# Patient Record
Sex: MALE
[De-identification: names, ages, dates, MRNs, and addresses within clinical notes are randomized per-mention and may not be internally consistent; named-entity substitution may affect disease eponyms.]

---

## 2021-10-05 ENCOUNTER — HOSPITAL ENCOUNTER (EMERGENCY)
Dept: HOSPITAL 56 - MW.ED | Age: 61
Discharge: HOME | End: 2021-10-05
Payer: COMMERCIAL

## 2021-10-05 DIAGNOSIS — S27.0XXA: ICD-10-CM
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
## 2021-10-05 DIAGNOSIS — S22.41XA: Primary | ICD-10-CM

## 2021-10-05 DIAGNOSIS — G20: ICD-10-CM

## 2021-10-05 DIAGNOSIS — K59.00: ICD-10-CM

## 2021-10-05 DIAGNOSIS — Z91.19: ICD-10-CM

## 2021-10-05 LAB
BUN SERPL-MCNC: 16 MG/DL (ref 7–18)
CHLORIDE SERPL-SCNC: 101 MMOL/L (ref 98–107)
CO2 SERPL-SCNC: 29.2 MMOL/L (ref 21–32)
GLUCOSE SERPL-MCNC: 126 MG/DL (ref 74–106)
LIPASE SERPL-CCNC: 43 U/L (ref 73–393)
POTASSIUM SERPL-SCNC: 4.4 MMOL/L (ref 3.5–5.1)
SODIUM SERPL-SCNC: 139 MMOL/L (ref 136–148)

## 2021-10-05 PROCEDURE — 80053 COMPREHEN METABOLIC PANEL: CPT

## 2021-10-05 PROCEDURE — 85025 COMPLETE CBC W/AUTO DIFF WBC: CPT

## 2021-10-05 PROCEDURE — 81003 URINALYSIS AUTO W/O SCOPE: CPT

## 2021-10-05 PROCEDURE — 83690 ASSAY OF LIPASE: CPT

## 2021-10-05 PROCEDURE — 74177 CT ABD & PELVIS W/CONTRAST: CPT

## 2021-10-05 PROCEDURE — 36415 COLL VENOUS BLD VENIPUNCTURE: CPT

## 2021-10-05 PROCEDURE — 99284 EMERGENCY DEPT VISIT MOD MDM: CPT

## 2021-10-05 NOTE — EDM.PDOC
ED HPI GENERAL MEDICAL PROBLEM





- General


Chief Complaint: Gastrointestinal Problem


Stated Complaint: CANT HAVE A BM


Time Seen by Provider: 10/05/21 09:39


Source of Information: Reports: Patient


History Limitations: Reports: No Limitations





- History of Present Illness


INITIAL COMMENTS - FREE TEXT/NARRATIVE: 


HISTORY AND PHYSICAL:





History of present illness:


Patient is a 60-year-old male who presents to the emergency room with complaints

of right-sided abdominal and flank pain post fall. Patient states 5 days ago he 

had fallen to the ground, mechanical fall due to Parkinson's. He states he did 

hit the left side of his cheek and somehow his right hip and back. Denies any 

LOC with fall. Since this time he has had right-sided abdominal and right flank 

pain. States when he tries to have a bowel movement he is only getting a smear 

on the toilet paper, does feel constipated. Patient denies any fever, chills, 

headache, change in vision, syncope or near syncope. Denies any shortness of 

breath or cough. Denies any nausea, vomiting, diarrhea or dysuria. Has not noted

any blood in urine or stool. Denies any testicular pain, redness or swelling. 

Patient has been eating and drinking appropriately. No recent travel or sick 

contacts.





Patient's daughter has accompanied him.  She states that he has been on 2 

medications for his Parkinson's.  The carbidopa/levodopa made patient too drowsy

so he stopped taking this medication. Patient never refilled his Pramipexole.  

Patient did have an appointment yesterday with Dr. Hussein although failed to 

make this appointment.





Review of systems: 


As per history of present illness and below otherwise all systems reviewed and 

negative.





Past medical history: 


As per history of present illness and as reviewed below otherwise noncon

tributory.





Surgical history: 


As per history of present illness and as reviewed below otherwise 

noncontributory.





Social history: 


See social history for further information





Family history: 


As per history of present illness and as reviewed below otherwise 

noncontributory.





Physical exam:


General: Well developed and well nourished 60 year old male. Alert and 

orientated x 3. Nontoxic in appearance and in no acute distress. Vital signs are

stable and have been reviewed by me. Nursing notes were reviewed. 


HEENT: Nontender, healing bruise under the left orbit. Normocephalic, pupils 

equal and reactive bilaterally, negative for conjunctival pallor or scleral 

icterus, mucous membranes moist, TMs normal bilaterally, throat clear, neck 

supple, nontender, trachea midline. No drooling or trismus noted. No meningeal 

signs. No hot potato voice noted. 


Lungs: Clear to auscultation bilaterally. No wheezes, rales, or rhonchi. Chest 

tender to the right distal/lateral chest wall. Normal work of breathing, no 

accessory muscles used.


Heart: S1S2, regular rate and rhythm without overt murmur, gallops, or rubs. No 

JVD. No peripheral edema


Abdomen: Soft, nondistended, nontender. Normoactive bowel sounds. Negative for 

masses or costovertebral tenderness.


C-spine/Back: No pinpoint vertebral tenderness upon palpation. No crepitus, 

step-offs or obvious deformities. Patient is ambulatory into the emergency room 

without difficulty or deficit. Denies any urinary or fecal incontinence. Denies 

any numbness, tingling or saddle paresthesia. No concerns of serious infection, 

fracture or cord compression, or cauda equina syndrome. Deep tendon reflexes 

brisk bilaterally.


Skin: Healing bruise under the left orbit and right lateral hip. Remaining skin 

is intact, warm, dry. No lesions or rashes noted.


Hematologic: No petechiae or purpra. Mucosa appropriate color and normal nail 

bed color and refill.


Extremities: Ambulatory, history of Parkinson's with noted hand tremor, moves 

all extremities per self without difficulty or deficits, negative for cords or 

calf pain. Neurovascular unremarkable.


Neuro: Awake, alert, oriented. Cranial nerves II through XII unremarkable. 

Cerebellum unremarkable. Motor and sensory unremarkable throughout. Exam 

nonfocal.


Psychiatric: Mood and affect are appropriate.  Normal thought process. Answering

questions appropriately.





Please note that the patient was seen and evaluated during the 2020 SARS-CoV-2 

novel coronavirus pandemic period.  Community viral transmission is ongoing at 

time of this encounter and the emergency department is operating under pandemic 

response procedures.





Medical Decision Making:


Patient is a 60-year-old male who presents to the emergency room with complaints

of right sided trunk pain after fall 5 days ago.  He states he has not been able

to have a bowel movement and is concerned he is constipated.  States when he 

goes to use the bathroom he only gets a smear on his toilet paper, states it is 

too painful to bear down and push.  Denies any blood in stools.  Patient does 

have a bruise around the left orbit, no impingement or pain with ocular 

movement.  He denies any loss of consciousness, headaches or change in vision.  

Declines a head CT.  Patient also has a bruise and tenderness of the right 

lateral chest wall/abdomen and flank.  We will do lab work and CT of the abdomen

and pelvis.





1230: Dr Quintero is here for another patient.  I did review this case, labs, 

physical findings and CT results with him.  He states it is safe for this 

patient to be discharged to home and recommends an incentive spirometer. I have 

talked with the patient about today's findings, in addition to providing 

specific details for plan of care.  I did offer to do an enema here to help with

the constipation, he declines.  He would prefer to take medication/enema at 

home.  We will give a bottle of magnesium citrate and an enema for home with 

education.  The daughter states that she will stay with him this evening while 

he is performing these to ensure results.  We did demonstrate how to use the 

incentive spirometer with thorough education.  Reassessment at the time of 

disposition demonstrates that the patient is in no acute distress.  The patient 

is stable for discharge, counseling was provided and we discussed in great 

detail signs and symptoms that would prompt them to return to the Emergency 

Department. Medication, follow up and supportive care measures were reviewed and

discussed. Voices understanding and is agreeable to plan of care. Denies any 

further questions or concerns at this time.





Diagnostics:


CBC, CMP, UA, CT abdomen and pelvis, lipase





Therapeutics:


IV fluid, enema, magnesium citrate





Prescription:


Norco





Impression: 


Parkinson's Disease


Medication non-compliance


Rib fractures


Pneumothorax, small


Constipation





Plan:


1.  You were evaluated today on an emergent basis. Your CT scan shows fractures 

of the right rib cage with a small pneumothorax.  Pneumothorax is a small 

collection of air between the lung and chest wall.  Please use the incentive 

spirometer to help resolve this and prevent a pneumonia.  Please do 6 to 8 

breaths every 4 hours while awake over the next 1 week.  Your CT scan also shows

copious amounts of stool.  When you get home please use the enema and drink half

bottle to full bottle of the magnesium citrate.  Make sure you are near a 

bathroom as this should cause you to clear out.  After you have a satisfactory 

bowel movement it would be ideal to start taking MiraLAX, 1 capful once daily.


2.  Tylenol and/or ibuprofen as needed for pain management.


3. Suddenly stopping your medications such as your Mirapex can cause symptoms 

such as fever, muscle stiffness, and confusion. Make sure you talk with Dr Dick before suddenly stopping any of your medications. These should be 

weaned gradually to avoid undesirable symptoms.We encourage you to follow up 

with Dr Dick in the next few days for re-evaluation and further 

care/management. 


4. If your symptoms should worsen, new symptoms develop or any of the signs and 

symptoms we discussed should arise please return to the emergency room or call 

911 (if needed).





Definitive disposition and diagnosis as appropriate pending reevaluation and 

review of above.





  ** right ribs


Pain Score (Numeric/FACES): 5





- Related Data


                                    Allergies











Allergy/AdvReac Type Severity Reaction Status Date / Time


 


No Known Allergies Allergy   Verified 10/05/21 09:39











Home Meds: 


                                    Home Meds





Carbidopa/Levodopa [Carbidopa-Levo  MG ODT] 1 tab PO TID 10/05/21 

[History]


Hydrocodone/Acetaminophen [HYDROcodone-Acetaminophen 5-325 MG] 1 tab PO Q4HR PRN

#15 tablet 10/05/21 [Rx]


Pramipexole [Mirapex] 1 tab PO TID 10/05/21 [History]











Past Medical History


Neurological History: Reports: Parkinson's





- Infectious Disease History


Infectious Disease History: Reports: Chicken Pox, Novel Coronavirus





Social & Family History





- Family History


Family Medical History: No Pertinent Family History





- Tobacco Use


Tobacco Use Status *Q: Never Tobacco User





- Recreational Drug Use


Recreational Drug Use: No





ED ROS GENERAL





- Review of Systems


Review Of Systems: Comprehensive ROS is negative, except as noted in HPI.





ED EXAM, GI/ABD





- Physical Exam


Exam: See Below (See dictation)





Course





- Vital Signs


Last Recorded V/S: 


                                Last Vital Signs











Temp  97.3 F   10/05/21 12:40


 


Pulse  68   10/05/21 12:40


 


Resp  16   10/05/21 12:40


 


BP  133/71   10/05/21 12:40


 


Pulse Ox  96   10/05/21 12:40














- Orders/Labs/Meds


Orders: 


                               Active Orders 24 hr











 Category Date Time Status


 


 Enema [RC] ASDIRECTED Care  10/05/21 12:29 Ordered


 


 Incentive Spirometry [RT Incentive Spirometry] [RC] Care  10/05/21 12:30 Order

ed





 ASDIRECTED   


 


 Sodium Chloride 0.9% [Normal Saline] 1,000 ml Med  10/05/21 09:44 Active





 IV STAT   








                                Medication Orders





Sodium Chloride (Normal Saline)  1,000 mls @ 150 mls/hr IV STAT ONE


   Stop: 10/05/21 16:23


   Last Admin: 10/05/21 10:22  Dose: 150 mls/hr


   Documented by: NICO








Labs: 


                                Laboratory Tests











  10/05/21 10/05/21 10/05/21 Range/Units





  10:19 10:19 10:40 


 


WBC  10.79    (4.0-11.0)  K/uL


 


RBC  5.08    (4.50-5.90)  M/uL


 


Hgb  15.7    (13.0-17.0)  g/dL


 


Hct  45.5    (38.0-50.0)  %


 


MCV  89.6    (80.0-98.0)  fL


 


MCH  30.9    (27.0-32.0)  pg


 


MCHC  34.5    (31.0-37.0)  g/dL


 


RDW Std Deviation  41.3    (28.0-62.0)  fl


 


RDW Coeff of Apolonia  13    (11.0-15.0)  %


 


Plt Count  207    (150-400)  K/uL


 


MPV  10.20    (7.40-12.00)  fL


 


Neut % (Auto)  78.2    (48.0-80.0)  %


 


Lymph % (Auto)  10.4 L    (16.0-40.0)  %


 


Mono % (Auto)  8.1    (0.0-15.0)  %


 


Eos % (Auto)  3.0    (0.0-7.0)  %


 


Baso % (Auto)  0.3    (0.0-1.5)  %


 


Neut # (Auto)  8.5 H    (1.4-5.7)  K/uL


 


Lymph # (Auto)  1.1    (0.6-2.4)  K/uL


 


Mono # (Auto)  0.9 H    (0.0-0.8)  K/uL


 


Eos # (Auto)  0.3    (0.0-0.7)  K/uL


 


Baso # (Auto)  0.0    (0.0-0.1)  K/uL


 


Nucleated RBC %  0.0    /100WBC


 


Nucleated RBCs #  0    K/uL


 


Sodium   139   (136-148)  mmol/L


 


Potassium   4.4   (3.5-5.1)  mmol/L


 


Chloride   101   ()  mmol/L


 


Carbon Dioxide   29.2   (21.0-32.0)  mmol/L


 


BUN   16   (7.0-18.0)  mg/dL


 


Creatinine   1.0   (0.8-1.3)  mg/dL


 


Est Cr Clr Drug Dosing   83.67   mL/min


 


Estimated GFR (MDRD)   > 60.0   ml/min


 


Glucose   126 H   ()  mg/dL


 


Calcium   8.4 L   (8.5-10.1)  mg/dL


 


Total Bilirubin   1.1 H   (0.2-1.0)  mg/dL


 


AST   20   (15-37)  IU/L


 


ALT   24   (14-63)  IU/L


 


Alkaline Phosphatase   120 H   ()  U/L


 


Total Protein   7.1   (6.4-8.2)  g/dL


 


Albumin   3.4   (3.4-5.0)  g/dL


 


Globulin   3.7   (2.6-4.0)  g/dL


 


Albumin/Globulin Ratio   0.9   (0.9-1.6)  


 


Lipase   43 L   ()  U/L


 


Urine Color    YELLOW  


 


Urine Appearance    CLEAR  


 


Urine pH    5.5  (5.0-8.0)  


 


Ur Specific Gravity    >= 1.030  (1.001-1.035)  


 


Urine Protein    NEGATIVE  (NEGATIVE)  mg/dL


 


Urine Glucose (UA)    NEGATIVE  (NEGATIVE)  mg/dL


 


Urine Ketones    NEGATIVE  (NEGATIVE)  mg/dL


 


Urine Occult Blood    NEGATIVE  (NEGATIVE)  


 


Urine Nitrite    NEGATIVE  (NEGATIVE)  


 


Urine Bilirubin    NEGATIVE  (NEGATIVE)  


 


Urine Urobilinogen    0.2  (<2.0)  EU/dL


 


Ur Leukocyte Esterase    NEGATIVE  (NEGATIVE)  











Meds: 


Medications











Generic Name Dose Route Start Last Admin





  Trade Name Freq  PRN Reason Stop Dose Admin


 


Sodium Chloride  1,000 mls @ 150 mls/hr  10/05/21 09:44  10/05/21 10:22





  Normal Saline  IV  10/05/21 16:23  150 mls/hr





  STAT ONE   Administration














Discontinued Medications














Generic Name Dose Route Start Last Admin





  Trade Name Freq  PRN Reason Stop Dose Admin


 


Iopamidol  100 ml  10/05/21 11:19  10/05/21 11:20





  Iopamidol 755 Mg/Ml 500 Ml Multipack Bottle  IVPUSH  10/05/21 11:20  100 ml





  ONETIME STA   Administration


 


Magnesium Citrate  1 ml  10/05/21 12:29  10/05/21 12:40





  Magnesium Citrate Solution 296 Ml Bottle  PO  10/05/21 12:30  296 ml





  ONETIME ONE   Administration














Departure





- Departure


Time of Disposition: 12:34


Disposition: Home, Self-Care 01


Clinical Impression: 


 Parkinson disease, Noncompliance with medication regimen





Constipation


Qualifiers:


 Constipation type: unspecified constipation type Qualified Code(s): K59.00 - 

Constipation, unspecified





Ribs, multiple fractures


Qualifiers:


 Encounter type: initial encounter Fracture type: closed Laterality: right 

Qualified Code(s): S22.41XA - Multiple fractures of ribs, right side, initial 

encounter for closed fracture





Fall


Qualifiers:


 Encounter type: initial encounter Qualified Code(s): W19.XXXA - Unspecified 

fall, initial encounter





Pneumothorax


Qualifiers:


 Pneumothorax type: traumatic Encounter type: initial encounter Qualified 

Code(s): S27.0XXA - Traumatic pneumothorax, initial encounter








- Discharge Information


Instructions:  Constipation, Adult, Easy-to-Read, Rib Fracture, Easy-to-Read


Referrals: 


Nadine Cadet MD [Primary Care Provider] - 


Forms:  ED Department Discharge


Additional Instructions: 


The following information is given to patients seen in the emergency department 

who are being discharged to home. This information is to outline your options 

for follow-up care. We provide all patients seen in our emergency department 

with a follow-up referral.





The need for follow-up, as well as the timing and circumstances, are variable 

depending upon the specifics of your emergency department visit.





If you don't have a primary care physician on staff, we will provide you with a 

referral. We always advise you to contact your personal physician following an 

emergency department visit to inform them of the circumstance of the visit and 

for follow-up with them and/or the need for any referrals to a consulting 

specialist.





The emergency department will also refer you to a specialist when appropriate. 

This referral assures that you have the opportunity for follow-up care with a 

specialist. All of these measure are taken in an effort to provide you with 

optimal care, which includes your follow-up.





Under all circumstances we always encourage you to contact your private 

physician who remains a resource for coordinating your care. When calling for 

follow-up care, please make the office aware that this follow-up is from your 

recent emergency room visit. If for any reason you are refused follow-up, please

contact the  Emergency Department

at [phone] and asked to speak to the emergency department charge nurse.








Primary Care


[address] [place] [address]


Phone: [phone]


Fax: [phone]





[facility]


[address] [place] [id number]


Phone: [phone]


Fax: [phone]





Thank you for choosing the [facility] emergency department in 

[place] for your medical needs today.  It was a pleasure caring for you. Today

you were seen in the emergency department for fall, rib fracture and 

constipation.





1.  You were evaluated today on an emergent basis. Your CT scan shows fractures 

of the right rib cage with a small pneumothorax.  Pneumothorax is a small 

collection of air between the lung and chest wall.  Please use the incentive 

spirometer to help resolve this and prevent a pneumonia.  Please do 6 to 8 

breaths every 4 hours while awake over the next 1 week.  Your CT scan also shows

copious amounts of stool.  When you get home please use the enema and drink half

bottle to full bottle of the magnesium citrate.  Make sure you are near a 

bathroom as this should cause you to clear out.  After you have a satisfactory 

bowel movement it would be ideal to start taking MiraLAX, 1 capful once daily.


2.  Tylenol and/or ibuprofen as needed for pain management.


3. Suddenly stopping your medications such as your Mirapex can cause symptoms 

such as fever, muscle stiffness, and confusion. Make sure you talk with Dr Dick before suddenly stopping any of your medications. These should be 

weaned gradually to avoid undesirable symptoms.We encourage you to follow up 

with Dr Dick in the next few days for re-evaluation and further 

care/management. 


4. If your symptoms should worsen, new symptoms develop or any of the signs and 

symptoms we discussed should arise please return to the emergency room or call 

911 (if needed).





Sepsis Event Note (ED)





- Evaluation


Sepsis Screening Result: No Definite Risk





- Focused Exam


Vital Signs: 


                                   Vital Signs











  Temp Pulse Resp BP Pulse Ox


 


 10/05/21 12:40  97.3 F  68  16  133/71  96


 


 10/05/21 09:36  97.1 F  78  18  132/80  94 L














- My Orders


Last 24 Hours: 


My Active Orders





10/05/21 09:44


Sodium Chloride 0.9% [Normal Saline] 1,000 ml IV STAT 





10/05/21 12:29


Enema [RC] ASDIRECTED 





10/05/21 12:30


Incentive Spirometry [RT Incentive Spirometry] [RC] ASDIRECTED 














- Assessment/Plan


Last 24 Hours: 


My Active Orders





10/05/21 09:44


Sodium Chloride 0.9% [Normal Saline] 1,000 ml IV STAT 





10/05/21 12:29


Enema [RC] ASDIRECTED 





10/05/21 12:30


Incentive Spirometry [RT Incentive Spirometry] [RC] ASDIRECTED